# Patient Record
Sex: FEMALE | Race: OTHER | URBAN - METROPOLITAN AREA
[De-identification: names, ages, dates, MRNs, and addresses within clinical notes are randomized per-mention and may not be internally consistent; named-entity substitution may affect disease eponyms.]

---

## 2019-04-22 ENCOUNTER — EMERGENCY (EMERGENCY)
Facility: HOSPITAL | Age: 24
LOS: 1 days | Discharge: HOME | End: 2019-04-22
Attending: EMERGENCY MEDICINE
Payer: SELF-PAY

## 2019-04-22 VITALS
SYSTOLIC BLOOD PRESSURE: 108 MMHG | HEIGHT: 62 IN | RESPIRATION RATE: 16 BRPM | DIASTOLIC BLOOD PRESSURE: 78 MMHG | WEIGHT: 199.96 LBS | TEMPERATURE: 98 F | HEART RATE: 96 BPM | OXYGEN SATURATION: 96 %

## 2019-04-22 VITALS
DIASTOLIC BLOOD PRESSURE: 58 MMHG | OXYGEN SATURATION: 100 % | RESPIRATION RATE: 20 BRPM | SYSTOLIC BLOOD PRESSURE: 108 MMHG | HEART RATE: 75 BPM | TEMPERATURE: 97 F

## 2019-04-22 DIAGNOSIS — Z98.84 BARIATRIC SURGERY STATUS: Chronic | ICD-10-CM

## 2019-04-22 LAB
ALBUMIN SERPL ELPH-MCNC: 4.2 G/DL — SIGNIFICANT CHANGE UP (ref 3.5–5)
ALP SERPL-CCNC: 64 U/L — SIGNIFICANT CHANGE UP (ref 40–120)
ALT FLD-CCNC: 90 U/L DA — HIGH (ref 10–60)
ANION GAP SERPL CALC-SCNC: 6 MMOL/L — SIGNIFICANT CHANGE UP (ref 5–17)
ANION GAP SERPL CALC-SCNC: 7 MMOL/L — SIGNIFICANT CHANGE UP (ref 5–17)
APPEARANCE UR: CLEAR — SIGNIFICANT CHANGE UP
AST SERPL-CCNC: 60 U/L — HIGH (ref 10–40)
BASOPHILS # BLD AUTO: 0.04 K/UL — SIGNIFICANT CHANGE UP (ref 0–0.2)
BASOPHILS NFR BLD AUTO: 0.6 % — SIGNIFICANT CHANGE UP (ref 0–2)
BILIRUB SERPL-MCNC: 1 MG/DL — SIGNIFICANT CHANGE UP (ref 0.2–1.2)
BILIRUB UR-MCNC: NEGATIVE — SIGNIFICANT CHANGE UP
BUN SERPL-MCNC: 12 MG/DL — SIGNIFICANT CHANGE UP (ref 7–18)
BUN SERPL-MCNC: 14 MG/DL — SIGNIFICANT CHANGE UP (ref 7–18)
CALCIUM SERPL-MCNC: 6.5 MG/DL — CRITICAL LOW (ref 8.4–10.5)
CALCIUM SERPL-MCNC: 9.1 MG/DL — SIGNIFICANT CHANGE UP (ref 8.4–10.5)
CHLORIDE SERPL-SCNC: 110 MMOL/L — HIGH (ref 96–108)
CHLORIDE SERPL-SCNC: 122 MMOL/L — HIGH (ref 96–108)
CO2 SERPL-SCNC: 17 MMOL/L — LOW (ref 22–31)
CO2 SERPL-SCNC: 21 MMOL/L — LOW (ref 22–31)
COLOR SPEC: YELLOW — SIGNIFICANT CHANGE UP
CREAT SERPL-MCNC: 0.61 MG/DL — SIGNIFICANT CHANGE UP (ref 0.5–1.3)
CREAT SERPL-MCNC: 1.03 MG/DL — SIGNIFICANT CHANGE UP (ref 0.5–1.3)
DIFF PNL FLD: ABNORMAL
EOSINOPHIL # BLD AUTO: 0.11 K/UL — SIGNIFICANT CHANGE UP (ref 0–0.5)
EOSINOPHIL NFR BLD AUTO: 1.6 % — SIGNIFICANT CHANGE UP (ref 0–6)
GLUCOSE SERPL-MCNC: 101 MG/DL — HIGH (ref 70–99)
GLUCOSE SERPL-MCNC: 62 MG/DL — LOW (ref 70–99)
GLUCOSE UR QL: NEGATIVE — SIGNIFICANT CHANGE UP
HCG UR QL: NEGATIVE — SIGNIFICANT CHANGE UP
HCT VFR BLD CALC: 42.4 % — SIGNIFICANT CHANGE UP (ref 34.5–45)
HGB BLD-MCNC: 14.3 G/DL — SIGNIFICANT CHANGE UP (ref 11.5–15.5)
IMM GRANULOCYTES NFR BLD AUTO: 0.3 % — SIGNIFICANT CHANGE UP (ref 0–1.5)
KETONES UR-MCNC: ABNORMAL
LEUKOCYTE ESTERASE UR-ACNC: ABNORMAL
LIDOCAIN IGE QN: 917 U/L — HIGH (ref 73–393)
LYMPHOCYTES # BLD AUTO: 1.63 K/UL — SIGNIFICANT CHANGE UP (ref 1–3.3)
LYMPHOCYTES # BLD AUTO: 23.1 % — SIGNIFICANT CHANGE UP (ref 13–44)
MCHC RBC-ENTMCNC: 29.2 PG — SIGNIFICANT CHANGE UP (ref 27–34)
MCHC RBC-ENTMCNC: 33.7 GM/DL — SIGNIFICANT CHANGE UP (ref 32–36)
MCV RBC AUTO: 86.7 FL — SIGNIFICANT CHANGE UP (ref 80–100)
MONOCYTES # BLD AUTO: 0.97 K/UL — HIGH (ref 0–0.9)
MONOCYTES NFR BLD AUTO: 13.8 % — SIGNIFICANT CHANGE UP (ref 2–14)
NEUTROPHILS # BLD AUTO: 4.28 K/UL — SIGNIFICANT CHANGE UP (ref 1.8–7.4)
NEUTROPHILS NFR BLD AUTO: 60.6 % — SIGNIFICANT CHANGE UP (ref 43–77)
NITRITE UR-MCNC: NEGATIVE — SIGNIFICANT CHANGE UP
NRBC # BLD: 0 /100 WBCS — SIGNIFICANT CHANGE UP (ref 0–0)
PH UR: 7 — SIGNIFICANT CHANGE UP (ref 5–8)
PLATELET # BLD AUTO: 309 K/UL — SIGNIFICANT CHANGE UP (ref 150–400)
POTASSIUM SERPL-MCNC: 2.3 MMOL/L — CRITICAL LOW (ref 3.5–5.3)
POTASSIUM SERPL-MCNC: 3.7 MMOL/L — SIGNIFICANT CHANGE UP (ref 3.5–5.3)
POTASSIUM SERPL-SCNC: 2.3 MMOL/L — CRITICAL LOW (ref 3.5–5.3)
POTASSIUM SERPL-SCNC: 3.7 MMOL/L — SIGNIFICANT CHANGE UP (ref 3.5–5.3)
PROT SERPL-MCNC: 9 G/DL — HIGH (ref 6–8.3)
PROT UR-MCNC: 100
RBC # BLD: 4.89 M/UL — SIGNIFICANT CHANGE UP (ref 3.8–5.2)
RBC # FLD: 15.7 % — HIGH (ref 10.3–14.5)
SODIUM SERPL-SCNC: 138 MMOL/L — SIGNIFICANT CHANGE UP (ref 135–145)
SODIUM SERPL-SCNC: 145 MMOL/L — SIGNIFICANT CHANGE UP (ref 135–145)
SP GR SPEC: 1 — LOW (ref 1.01–1.02)
UROBILINOGEN FLD QL: 4
WBC # BLD: 7.05 K/UL — SIGNIFICANT CHANGE UP (ref 3.8–10.5)
WBC # FLD AUTO: 7.05 K/UL — SIGNIFICANT CHANGE UP (ref 3.8–10.5)

## 2019-04-22 PROCEDURE — 99285 EMERGENCY DEPT VISIT HI MDM: CPT

## 2019-04-22 PROCEDURE — 99284 EMERGENCY DEPT VISIT MOD MDM: CPT | Mod: 25

## 2019-04-22 PROCEDURE — 80053 COMPREHEN METABOLIC PANEL: CPT

## 2019-04-22 PROCEDURE — 85027 COMPLETE CBC AUTOMATED: CPT

## 2019-04-22 PROCEDURE — 96374 THER/PROPH/DIAG INJ IV PUSH: CPT

## 2019-04-22 PROCEDURE — 80048 BASIC METABOLIC PNL TOTAL CA: CPT

## 2019-04-22 PROCEDURE — 93005 ELECTROCARDIOGRAM TRACING: CPT

## 2019-04-22 PROCEDURE — 83690 ASSAY OF LIPASE: CPT

## 2019-04-22 PROCEDURE — 36415 COLL VENOUS BLD VENIPUNCTURE: CPT

## 2019-04-22 PROCEDURE — 96376 TX/PRO/DX INJ SAME DRUG ADON: CPT

## 2019-04-22 PROCEDURE — 81001 URINALYSIS AUTO W/SCOPE: CPT

## 2019-04-22 PROCEDURE — 81025 URINE PREGNANCY TEST: CPT

## 2019-04-22 RX ORDER — POTASSIUM CHLORIDE 20 MEQ
40 PACKET (EA) ORAL ONCE
Qty: 0 | Refills: 0 | Status: COMPLETED | OUTPATIENT
Start: 2019-04-22 | End: 2019-04-22

## 2019-04-22 RX ORDER — POTASSIUM CHLORIDE 20 MEQ
10 PACKET (EA) ORAL
Qty: 0 | Refills: 0 | Status: COMPLETED | OUTPATIENT
Start: 2019-04-22 | End: 2019-04-22

## 2019-04-22 RX ORDER — LIDOCAINE 4 G/100G
5 CREAM TOPICAL ONCE
Qty: 0 | Refills: 0 | Status: COMPLETED | OUTPATIENT
Start: 2019-04-22 | End: 2019-04-22

## 2019-04-22 RX ORDER — ONDANSETRON 8 MG/1
1 TABLET, FILM COATED ORAL
Qty: 10 | Refills: 0 | OUTPATIENT
Start: 2019-04-22

## 2019-04-22 RX ORDER — FAMOTIDINE 10 MG/ML
20 INJECTION INTRAVENOUS ONCE
Qty: 0 | Refills: 0 | Status: COMPLETED | OUTPATIENT
Start: 2019-04-22 | End: 2019-04-22

## 2019-04-22 RX ADMIN — Medication 30 MILLILITER(S): at 13:02

## 2019-04-22 RX ADMIN — Medication 100 MILLIEQUIVALENT(S): at 12:30

## 2019-04-22 RX ADMIN — Medication 100 MILLIEQUIVALENT(S): at 11:31

## 2019-04-22 RX ADMIN — Medication 40 MILLIEQUIVALENT(S): at 11:31

## 2019-04-22 RX ADMIN — Medication 40 MILLIEQUIVALENT(S): at 12:48

## 2019-04-22 RX ADMIN — LIDOCAINE 5 MILLILITER(S): 4 CREAM TOPICAL at 13:02

## 2019-04-22 RX ADMIN — Medication 100 MILLIEQUIVALENT(S): at 13:03

## 2019-04-22 RX ADMIN — FAMOTIDINE 20 MILLIGRAM(S): 10 INJECTION INTRAVENOUS at 13:01

## 2019-04-22 NOTE — ED PROVIDER NOTE - CLINICAL SUMMARY MEDICAL DECISION MAKING FREE TEXT BOX
22 y/o F pt presenting with vomiting x1 month s/p gastric sleeve. Currently pt's abd is nontender, therefore, will defer imaging studies. Will obtain labs and reassess. 22 y/o F pt presenting with vomiting x1 month s/p gastric sleeve. no change in nature of emesis, and mostly occurs after dinner in evening. Currently pt's abd is nontender, therefore, will defer imaging studies. Will obtain labs and reassess.

## 2019-04-22 NOTE — ED PROVIDER NOTE - OBJECTIVE STATEMENT
22 y/o F pt with PSHx gastric sleeve (on March 4th at [place]) presents to ED c/o frequent vomiting x ~1 month. Pt also reports associated bloating, constipation, not passing gas, and abd discomfort described as burning sensation. Pt denies fever, chills, diarrhea, dysuria, or any other complaints. 24 y/o F pt with PSHx gastric sleeve (on March 4th at [place]) presents to ED c/o frequent vomiting x ~1 month. Pt also reports associated bloating and abd discomfort described as burning sensation. Pt reports that she is having nl BMs and is passing gas. Pt denies fever, chills, diarrhea, dysuria, or any other complaints.

## 2019-04-22 NOTE — ED PROVIDER NOTE - NSFOLLOWUPINSTRUCTIONS_ED_ALL_ED_FT
Sleeve Gastrectomy, Care After  Refer to this sheet in the next few weeks. These instructions provide you with information about caring for yourself after your procedure. Your health care provider may also give you more specific instructions. Your treatment has been planned according to current medical practices, but problems sometimes occur. Call your health care provider if you have any problems or questions after your procedure.    What can I expect after the procedure?  After the procedure, it is common to have:    Pain in your abdomen.  Decreased appetite.  Clear fluid leaking through the small tube (drain) that comes from your incision site.    Follow these instructions at home:  Medicines     Take over-the-counter and prescription medicines only as told by your health care provider.  Do not drive for 24 hours if you received a sedative.  Do not drive or operate heavy machinery while taking prescription pain medicine.  Incision and drain care     Image   Follow instructions from your health care provider about how to take care of your incisions. Make sure you:    Wash your hands with soap and water before you change your bandage (dressing). If soap and water are not available, use hand .  Change your dressing as told by your health care provider.  Leave stitches (sutures), skin glue, or adhesive strips in place. These skin closures may need to be in place for 2 weeks or longer. If adhesive strip edges start to loosen and curl up, you may trim the loose edges. Do not remove adhesive strips completely unless your health care provider tells you to do that.    Keep the area around your incisions and your drain clean and dry.  Check your incision areas every day for signs of infection. Check for:    More redness, swelling, or pain.  More fluid or blood.  Warmth.  Pus or a bad smell.    Empty your drain every day. Follow instructions from your health care provider about recording the amount of fluid that comes from your drain. Make note of any changes in the amount or appearance of the fluid.  Activity     Return to your normal activities as told by your health care provider. Ask your health care provider what activities are safe for you.  Do not lift anything that is heavier than 10 lb (4.5 kg).  Avoid intense physical activity for as long as told by your health care provider.  Move around at least once per day, every day. As you start to feel better, you may start to exercise more.  Eating and drinking     Follow instructions from your health care provider about eating or drinking restrictions. You will be given instructions about the type, the size, and the timing of your meals.    Keep track of any foods that cause discomfort, such as bloating or cramping.  Eat healthy foods. Avoid foods that are high in fat or sugar.    Stop eating when you feel full.  Take supplements only as told by your health care provider.  Drink enough fluid to keep your urine clear or pale yellow.  General instructions     Do not take baths, swim, or use a hot tub until your health care provider approves. Ask your health care provider if you can take showers. You may only be allowed to take sponge baths for bathing.  Do not use tobacco products, including cigarettes, chewing tobacco, or e-cigarettes. If you need help quitting, ask your health care provider.  Wear compression stockings as told by your health care provider. These stockings help to prevent blood clots and reduce swelling in your legs.  Do breathing exercises as told by your health care provider.  Keep all follow-up visits as told by your health care provider. This is important.  Contact a health care provider if:  You have pain that gets worse or does not get better with medicine.  You have more redness, swelling, or pain around your incisions.  You have more fluid or blood coming from your incisions.  Your incisions feel warm to the touch.  You have pus or a bad smell coming from your incisions.  You have a fever or chills.  You have problems with your drain.  You have green or bad-smelling fluid leaking from your drain.  Get help right away if:  You have difficulty breathing.  You have severe pain, especially in your legs.  This information is not intended to replace advice given to you by your health care provider. Make sure you discuss any questions you have with your health care provider. Sleeve Gastrectomy, Care After  Refer to this sheet in the next few weeks. These instructions provide you with information about caring for yourself after your procedure. Your health care provider may also give you more specific instructions. Your treatment has been planned according to current medical practices, but problems sometimes occur. Call your health care provider if you have any problems or questions after your procedure.    What can I expect after the procedure?  After the procedure, it is common to have:    Pain in your abdomen.  Decreased appetite.  Clear fluid leaking through the small tube (drain) that comes from your incision site.    Follow these instructions at home:  Medicines     Take over-the-counter and prescription medicines only as told by your health care provider.  Do not drive for 24 hours if you received a sedative.  Do not drive or operate heavy machinery while taking prescription pain medicine.  Incision and drain care     Image   Follow instructions from your health care provider about how to take care of your incisions. Make sure you:    Wash your hands with soap and water before you change your bandage (dressing). If soap and water are not available, use hand .  Change your dressing as told by your health care provider.  Leave stitches (sutures), skin glue, or adhesive strips in place. These skin closures may need to be in place for 2 weeks or longer. If adhesive strip edges start to loosen and curl up, you may trim the loose edges. Do not remove adhesive strips completely unless your health care provider tells you to do that.    Keep the area around your incisions and your drain clean and dry.  Check your incision areas every day for signs of infection. Check for:    More redness, swelling, or pain.  More fluid or blood.  Warmth.  Pus or a bad smell.    Empty your drain every day. Follow instructions from your health care provider about recording the amount of fluid that comes from your drain. Make note of any changes in the amount or appearance of the fluid.  Activity     Return to your normal activities as told by your health care provider. Ask your health care provider what activities are safe for you.  Do not lift anything that is heavier than 10 lb (4.5 kg).  Avoid intense physical activity for as long as told by your health care provider.  Move around at least once per day, every day. As you start to feel better, you may start to exercise more.  Eating and drinking     Follow instructions from your health care provider about eating or drinking restrictions. You will be given instructions about the type, the size, and the timing of your meals.    Keep track of any foods that cause discomfort, such as bloating or cramping.  Eat healthy foods. Avoid foods that are high in fat or sugar.    Stop eating when you feel full.  Take supplements only as told by your health care provider.  Drink enough fluid to keep your urine clear or pale yellow.  General instructions     Do not take baths, swim, or use a hot tub until your health care provider approves. Ask your health care provider if you can take showers. You may only be allowed to take sponge baths for bathing.  Do not use tobacco products, including cigarettes, chewing tobacco, or e-cigarettes. If you need help quitting, ask your health care provider.  Wear compression stockings as told by your health care provider. These stockings help to prevent blood clots and reduce swelling in your legs.  Do breathing exercises as told by your health care provider.  Keep all follow-up visits as told by your health care provider. This is important.  Contact a health care provider if:  You have pain that gets worse or does not get better with medicine.  You have more redness, swelling, or pain around your incisions.  You have more fluid or blood coming from your incisions.  Your incisions feel warm to the touch.  You have pus or a bad smell coming from your incisions.  You have a fever or chills.  You have problems with your drain.  You have green or bad-smelling fluid leaking from your drain.  Get help right away if:  You have difficulty breathing.  You have severe pain, especially in your legs.  This information is not intended to replace advice given to you by your health care provider. Make sure you discuss any questions you have with your health care provider.    Hypokalemia    WHAT YOU NEED TO KNOW:    Hypokalemia is a low level of potassium in your blood. Potassium helps control how your muscles, heart, and digestive system work. Hypokalemia occurs when your body loses too much potassium or does not absorb enough from food.     DISCHARGE INSTRUCTIONS:    Seek care immediately if:     You cannot move your arm or leg.      You have a fast or irregular heartbeat.      You are too tired or weak to stand up.    Contact your healthcare provider if:     You are vomiting, or you have diarrhea.      You have numbness or tingling in your arms or legs.      Your symptoms do not go away or they get worse.      You have questions or concerns about your condition or care.    Medicines:     Potassium will be given to bring your potassium levels back to normal.      Take your medicine as directed. Contact your healthcare provider if you think your medicine is not helping or if you have side effects. Tell him of her if you are allergic to any medicine. Keep a list of the medicines, vitamins, and herbs you take. Include the amounts, and when and why you take them. Bring the list or the pill bottles to follow-up visits. Carry your medicine list with you in case of an emergency.    Eat foods that are high in potassium: Foods that are high in potassium include bananas, oranges, tomatoes, potatoes, and avocado. Olguin beans, turkey, salmon, lean beef, yogurt, and milk are also high in potassium. Ask your healthcare provider or dietitian for more information about foods that are high in potassium.     Follow up with your healthcare provider as directed: Write down your questions so you remember to ask them during your visits.     Calcium Intake Recommendations  Calcium is a mineral that affects many functions in the body, including:    Blood clotting.  Blood vessel function.  Nerve impulse conduction.  Hormone secretion.  Muscle contraction.  Bone and teeth functions.    Most of your body's calcium supply is stored in your bones and teeth. When your calcium stores are low, you may be at risk for low bone mass, bone loss, and bone fractures. Consuming enough calcium helps to grow healthy bones and teeth and to prevent breakdown over time.    It is very important that you get enough calcium if you are:    A child undergoing rapid growth.  An adolescent girl.  A pre- or post-menopausal woman.  A woman whose menstrual cycle has stopped due to anorexia nervosa or regular intense exercise.  An individual with lactose intolerance or a milk allergy.  A vegetarian.    What is my plan?  Try to consume the recommended amount of calcium daily based on your age. Depending on your overall health, your health care provider may recommend increased calcium intake. General daily calcium intake recommendations by age are:    Birth to 6 months: 200 mg.  Infants 7 to 12 months: 260 mg.  Children 1 to 3 years: 700 mg.  Children 4 to 8 years: 1,000 mg.  Children 9 to 13 years: 1,300 mg.  Teens 14 to 18 years: 1,300 mg.  Adults 19 to 50 years: 1,000 mg.  Adult women 51 to 70 years: 1,200 mg.  Adult men 51 to 70 years: 1,000 mg.  Adults 71 years and older: 1,200 mg.  Pregnant and breastfeeding teens: 1,300 mg.  Pregnant and breastfeeding adults: 1,000 mg.    What do I need to know about calcium intake?  In order for the body to absorb calcium, it needs vitamin D. You can get vitamin D through:    Direct exposure of the skin to sunlight.  Foods, such as egg yolks, liver, saltwater fish, and fortified milk.  Supplements.    Consuming too much calcium may cause:    Constipation.  Decreased absorption of iron and zinc.  Kidney stones.    Calcium supplements may interact with certain medicines. Check with your health care provider before starting any calcium supplements.  Try to get most of your calcium from food.  What foods can I eat?  Grains     Image   Fortified oatmeal. Fortified ready-to-eat cereals. Fortified frozen waffles.    Vegetables     Turnip greens. Broccoli.    Fruits     Fortified orange juice.    Meats and Other Protein Sources     Canned sardines with bones. Canned salmon with bones. Soy beans. Tofu. Baked beans. Almonds. Brazil nuts. Sunflower seeds.    Dairy     Milk. Yogurt. Cheese. Cottage cheese.    Beverages     Fortified soy milk. Fortified rice milk.    Sweets/Desserts     Pudding. Ice Cream. Milkshakes. Blackstrap molasses.    The items listed above may not be a complete list of recommended foods or beverages. Contact your dietitian for more options.     What foods can affect my calcium intake?  It may be more difficult for your body to use calcium or calcium may leave your body more quickly if you consume large amounts of:    Sodium.  Protein.  Caffeine.  Alcohol.

## 2019-04-22 NOTE — ED PROVIDER NOTE - PROGRESS NOTE DETAILS
Pt w hypoK on labs, no assoc EKG changes, repleted, advised to continue small portion control, intake of MVs and fu w [facility] where pt had gastric sleeve procedure. Evan PO prior to dc.

## 2019-04-22 NOTE — ED PROVIDER NOTE - CARE PROVIDER_API CALL
Joe Blackwood (MD)  Surgery  [address] [place] [address]  Phone: [phone]  Fax: [phone]  Follow Up Time:

## 2019-04-29 ENCOUNTER — INBOUND DOCUMENT (OUTPATIENT)
Age: 24
End: 2019-04-29

## 2019-06-24 PROBLEM — Z00.00 ENCOUNTER FOR PREVENTIVE HEALTH EXAMINATION: Status: ACTIVE | Noted: 2019-06-24

## 2022-03-31 NOTE — PROVIDER CONTACT NOTE (CRITICAL VALUE NOTIFICATION) - NAME OF MD/NP/PA/DO NOTIFIED:
How Severe Is Your Skin Lesion?: moderate Have Your Skin Lesions Been Treated?: not been treated Is This A New Presentation, Or A Follow-Up?: Skin Lesion MOR

## 2024-08-22 ENCOUNTER — APPOINTMENT (OUTPATIENT)
Dept: ANTEPARTUM | Facility: CLINIC | Age: 29
End: 2024-08-22

## 2024-08-22 ENCOUNTER — ASOB RESULT (OUTPATIENT)
Age: 29
End: 2024-08-22

## 2024-08-22 PROCEDURE — 76801 OB US < 14 WKS SINGLE FETUS: CPT | Mod: 59

## 2024-08-22 PROCEDURE — 76813 OB US NUCHAL MEAS 1 GEST: CPT

## 2024-08-22 PROCEDURE — 76817 TRANSVAGINAL US OBSTETRIC: CPT | Mod: 59

## 2024-10-11 ENCOUNTER — ASOB RESULT (OUTPATIENT)
Age: 29
End: 2024-10-11

## 2024-10-11 ENCOUNTER — APPOINTMENT (OUTPATIENT)
Dept: ANTEPARTUM | Facility: CLINIC | Age: 29
End: 2024-10-11
Payer: COMMERCIAL

## 2024-10-11 PROCEDURE — 76811 OB US DETAILED SNGL FETUS: CPT

## 2024-10-17 ENCOUNTER — APPOINTMENT (OUTPATIENT)
Dept: ANTEPARTUM | Facility: CLINIC | Age: 29
End: 2024-10-17
Payer: COMMERCIAL

## 2024-10-17 ENCOUNTER — ASOB RESULT (OUTPATIENT)
Age: 29
End: 2024-10-17

## 2024-10-17 ENCOUNTER — APPOINTMENT (OUTPATIENT)
Dept: ANTEPARTUM | Facility: CLINIC | Age: 29
End: 2024-10-17

## 2024-10-17 PROCEDURE — 99202 OFFICE O/P NEW SF 15 MIN: CPT | Mod: 25

## 2024-10-17 PROCEDURE — 76816 OB US FOLLOW-UP PER FETUS: CPT

## 2025-02-28 ENCOUNTER — ASOB RESULT (OUTPATIENT)
Age: 30
End: 2025-02-28

## 2025-02-28 ENCOUNTER — APPOINTMENT (OUTPATIENT)
Dept: ANTEPARTUM | Facility: CLINIC | Age: 30
End: 2025-02-28
Payer: MEDICAID

## 2025-02-28 ENCOUNTER — OUTPATIENT (OUTPATIENT)
Dept: INPATIENT UNIT | Facility: HOSPITAL | Age: 30
LOS: 1 days | Discharge: HOME | End: 2025-02-28

## 2025-02-28 VITALS
DIASTOLIC BLOOD PRESSURE: 67 MMHG | HEART RATE: 78 BPM | RESPIRATION RATE: 16 BRPM | TEMPERATURE: 98 F | SYSTOLIC BLOOD PRESSURE: 106 MMHG

## 2025-02-28 VITALS — HEART RATE: 101 BPM | DIASTOLIC BLOOD PRESSURE: 67 MMHG | SYSTOLIC BLOOD PRESSURE: 98 MMHG

## 2025-02-28 DIAGNOSIS — O26.899 OTHER SPECIFIED PREGNANCY RELATED CONDITIONS, UNSPECIFIED TRIMESTER: ICD-10-CM

## 2025-02-28 DIAGNOSIS — Z98.84 BARIATRIC SURGERY STATUS: Chronic | ICD-10-CM

## 2025-02-28 DIAGNOSIS — Z98.890 OTHER SPECIFIED POSTPROCEDURAL STATES: Chronic | ICD-10-CM

## 2025-02-28 DIAGNOSIS — Z3A.40 40 WEEKS GESTATION OF PREGNANCY: ICD-10-CM

## 2025-02-28 DIAGNOSIS — O36.8330 MATERNAL CARE FOR ABNORMALITIES OF THE FETAL HEART RATE OR RHYTHM, THIRD TRIMESTER, NOT APPLICABLE OR UNSPECIFIED: ICD-10-CM

## 2025-02-28 PROCEDURE — 76815 OB US LIMITED FETUS(S): CPT | Mod: 26

## 2025-02-28 PROCEDURE — 76819 FETAL BIOPHYS PROFIL W/O NST: CPT | Mod: 26

## 2025-02-28 NOTE — OB PROVIDER TRIAGE NOTE - HISTORY OF PRESENT ILLNESS
29 year old  at 40 weeks was sent to triage for monitoring for NRNST in office, as per Dr. Luis, patient had moderate variability with one acceleration and no decelerations with a bpp of 8/8. Patient denies contractions, leaking of fluid, vaginal bleeding, reports good fetal movement. Denies ob complications.   Meds: Valtrex 500mg BID, baby asa BID     PNC: [facility]

## 2025-02-28 NOTE — OB PROVIDER TRIAGE NOTE - NS_OBGYNHISTORY_OBGYN_ALL_OB_FT
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

## 2025-02-28 NOTE — OB PROVIDER TRIAGE NOTE - PLAN OF CARE
Fetal and Maternal status reassuring   NST reactive, bpp 8/8   Tracing reviewed by Dr. Luis   patient to be discharged home at this time     labor precautions reviewed  fetal kick counts reviewed  oral hydration encouraged   follow up with OB by Tuesday 3/4     discharged at: Fetal and Maternal status reassuring   NST reactive, bpp 8/8   Tracing reviewed by Dr. Luis   patient to be discharged home at this time     labor precautions reviewed  fetal kick counts reviewed  oral hydration encouraged   follow up with OB by Tuesday 3/4     discharged at: 1520

## 2025-02-28 NOTE — OB PROVIDER TRIAGE NOTE - NSHPPHYSICALEXAM_GEN_ALL_CORE
Vital Signs Last 24 Hrs  T(C): 36.4 (28 Feb 2025 13:56), Max: 36.4 (28 Feb 2025 13:56)  T(F): 97.5 (28 Feb 2025 13:56), Max: 97.5 (28 Feb 2025 13:56)  HR: 101 (28 Feb 2025 14:10) (78 - 101)  BP: 98/67 (28 Feb 2025 14:10) (98/67 - 106/67)  BP(mean): --  RR: 16 (28 Feb 2025 13:56) (16 - 16)  SpO2: --    Assessment reveals VSS  General: Female sitting comfortably in no apparent distress  Neuro: No facial asymmetry, no slurred speech, moves all 4 extremities  Mood: Alert and lucid, appropriate mood and affect  A&Ox3  Lungs- clear bilateral  Heart- normal rate and regular rhythm  Extremities- Warm, Dry, no edema present, good pulses   Abdomen soft, NT, gravid  Cat 1 tracing reactive  moderate variability with accels and no decels, irregular contractions on toco   Transabdominal Ultrasound- cephalic, anterior placenta, m mode 124, URBAN 14.35, bpp 8/8- images saved ASOB

## 2025-02-28 NOTE — OB PROVIDER TRIAGE NOTE - NSOBPROVIDERNOTE_OBGYN_ALL_OB_FT
1510: Fetal and maternal status reassuring   -Tracing reviewed by Dr. Kang  -patient approved to be discharged home at this time

## 2025-02-28 NOTE — OB PROVIDER TRIAGE NOTE - NS_FHRACCEL_OBGYN_ALL_OB
Has she ever seen a colorectal surgeon for this issue?  I would recommend she see one if not. Laura Fernández is one she could see if she is on her insurance.   Present (15 x15 bpm)

## 2025-02-28 NOTE — OB PROVIDER TRIAGE NOTE - NSICDXPASTSURGICALHX_GEN_ALL_CORE_FT
Detail Level: Zone PAST SURGICAL HISTORY:  H/O cosmetic surgery     H/O gastric bypass     H/O hernia repair

## 2025-02-28 NOTE — OB PROVIDER TRIAGE NOTE - NS_TRIAGEEVALUATION_OBGYN_ALL_OB_DT
Received call from Angely Thomas, work comp calling to see if patient was seen 4/28/17. Informed message was given to Jacqueline Levin on Friday that patient was seen but note not complete. Will forward within the next 5 business days. No new appointment scheduled.   She verbalized understanding.     MARILOU Bentley RN    
Received voicemail from Angely Thomas, work comp. They want to clarify patient was seen today with Dr. French and would like to know restrictions and work status, diagnosis, medications and treatment, as well as next office visit. They also want office visit note faxed to attn: Jacqueline Levin, nurse : [phone]. If questions, please call: [phone], Ext [phone].     Office visit not  complete from today. Angely sent written request that has already been scanned.   Left message for Jacqueline that we have received written request and will send note when it is completed.     Awaiting note to be completed.     MARILOU Bentley RN    
Visit note and letter from 4/27/2017 office visit faxed as requested.  
28-Feb-2025 14:40

## 2025-02-28 NOTE — OB PROVIDER TRIAGE NOTE - ADDITIONAL INSTRUCTIONS
Return for contractions, leaking of fluid, vaginal bleeding, decreased fetal movement  Fetal kick counts reviewed  oral hydration encouraged  follow up with OB by Tuesday 3/4

## 2025-03-08 ENCOUNTER — INPATIENT (INPATIENT)
Facility: HOSPITAL | Age: 30
LOS: 2 days | Discharge: HOME | End: 2025-03-11
Attending: STUDENT IN AN ORGANIZED HEALTH CARE EDUCATION/TRAINING PROGRAM | Admitting: STUDENT IN AN ORGANIZED HEALTH CARE EDUCATION/TRAINING PROGRAM
Payer: MEDICAID

## 2025-03-08 ENCOUNTER — APPOINTMENT (OUTPATIENT)
Dept: ANTEPARTUM | Facility: CLINIC | Age: 30
End: 2025-03-08

## 2025-03-08 VITALS
RESPIRATION RATE: 16 BRPM | OXYGEN SATURATION: 100 % | TEMPERATURE: 98 F | SYSTOLIC BLOOD PRESSURE: 86 MMHG | HEIGHT: 62 IN | HEART RATE: 66 BPM | DIASTOLIC BLOOD PRESSURE: 55 MMHG | WEIGHT: 194.01 LBS

## 2025-03-08 DIAGNOSIS — O26.899 OTHER SPECIFIED PREGNANCY RELATED CONDITIONS, UNSPECIFIED TRIMESTER: ICD-10-CM

## 2025-03-08 DIAGNOSIS — Z98.890 OTHER SPECIFIED POSTPROCEDURAL STATES: Chronic | ICD-10-CM

## 2025-03-08 DIAGNOSIS — Z98.84 BARIATRIC SURGERY STATUS: Chronic | ICD-10-CM

## 2025-03-08 LAB
BASOPHILS # BLD AUTO: 0.04 K/UL — SIGNIFICANT CHANGE UP (ref 0–0.2)
BASOPHILS NFR BLD AUTO: 0.5 % — SIGNIFICANT CHANGE UP (ref 0–2)
BLD GP AB SCN SERPL QL: NEGATIVE — SIGNIFICANT CHANGE UP
EOSINOPHIL # BLD AUTO: 0.06 K/UL — SIGNIFICANT CHANGE UP (ref 0–0.5)
EOSINOPHIL NFR BLD AUTO: 0.8 % — SIGNIFICANT CHANGE UP (ref 0–6)
HCT VFR BLD CALC: 34.5 % — SIGNIFICANT CHANGE UP (ref 34.5–45)
HGB BLD-MCNC: 12.4 G/DL — SIGNIFICANT CHANGE UP (ref 11.5–15.5)
IANC: 5.58 K/UL — SIGNIFICANT CHANGE UP (ref 1.8–7.4)
IMM GRANULOCYTES NFR BLD AUTO: 0.9 % — SIGNIFICANT CHANGE UP (ref 0–0.9)
LYMPHOCYTES # BLD AUTO: 1.09 K/UL — SIGNIFICANT CHANGE UP (ref 1–3.3)
LYMPHOCYTES # BLD AUTO: 14.4 % — SIGNIFICANT CHANGE UP (ref 13–44)
MCHC RBC-ENTMCNC: 31.1 PG — SIGNIFICANT CHANGE UP (ref 27–34)
MCHC RBC-ENTMCNC: 35.9 G/DL — SIGNIFICANT CHANGE UP (ref 32–36)
MCV RBC AUTO: 86.5 FL — SIGNIFICANT CHANGE UP (ref 80–100)
MONOCYTES # BLD AUTO: 0.72 K/UL — SIGNIFICANT CHANGE UP (ref 0–0.9)
MONOCYTES NFR BLD AUTO: 9.5 % — SIGNIFICANT CHANGE UP (ref 2–14)
NEUTROPHILS # BLD AUTO: 5.58 K/UL — SIGNIFICANT CHANGE UP (ref 1.8–7.4)
NEUTROPHILS NFR BLD AUTO: 73.9 % — SIGNIFICANT CHANGE UP (ref 43–77)
NRBC # BLD AUTO: 0 K/UL — SIGNIFICANT CHANGE UP (ref 0–0)
NRBC # FLD: 0 K/UL — SIGNIFICANT CHANGE UP (ref 0–0)
NRBC BLD AUTO-RTO: 0 /100 WBCS — SIGNIFICANT CHANGE UP (ref 0–0)
PLATELET # BLD AUTO: 345 K/UL — SIGNIFICANT CHANGE UP (ref 150–400)
RBC # BLD: 3.99 M/UL — SIGNIFICANT CHANGE UP (ref 3.8–5.2)
RBC # FLD: 16.7 % — HIGH (ref 10.3–14.5)
RH IG SCN BLD-IMP: POSITIVE — SIGNIFICANT CHANGE UP
RH IG SCN BLD-IMP: POSITIVE — SIGNIFICANT CHANGE UP
WBC # BLD: 7.56 K/UL — SIGNIFICANT CHANGE UP (ref 3.8–10.5)
WBC # FLD AUTO: 7.56 K/UL — SIGNIFICANT CHANGE UP (ref 3.8–10.5)

## 2025-03-08 RX ORDER — SODIUM CHLORIDE 9 G/1000ML
1000 INJECTION, SOLUTION INTRAVENOUS
Refills: 0 | Status: DISCONTINUED | OUTPATIENT
Start: 2025-03-08 | End: 2025-03-08

## 2025-03-08 RX ORDER — SODIUM CHLORIDE 9 G/1000ML
1000 INJECTION, SOLUTION INTRAVENOUS
Refills: 0 | Status: DISCONTINUED | OUTPATIENT
Start: 2025-03-08 | End: 2025-03-09

## 2025-03-08 RX ORDER — OXYTOCIN-SODIUM CHLORIDE 0.9% IV SOLN 30 UNIT/500ML 30-0.9/5 UT/ML-%
167 SOLUTION INTRAVENOUS
Qty: 30 | Refills: 0 | Status: DISCONTINUED | OUTPATIENT
Start: 2025-03-08 | End: 2025-03-09

## 2025-03-08 RX ORDER — OXYTOCIN-SODIUM CHLORIDE 0.9% IV SOLN 30 UNIT/500ML 30-0.9/5 UT/ML-%
2 SOLUTION INTRAVENOUS
Qty: 30 | Refills: 0 | Status: DISCONTINUED | OUTPATIENT
Start: 2025-03-08 | End: 2025-03-09

## 2025-03-08 RX ORDER — ONDANSETRON HCL/PF 4 MG/2 ML
4 VIAL (ML) INJECTION ONCE
Refills: 0 | Status: COMPLETED | OUTPATIENT
Start: 2025-03-08 | End: 2025-03-08

## 2025-03-08 RX ORDER — OXYTOCIN-SODIUM CHLORIDE 0.9% IV SOLN 30 UNIT/500ML 30-0.9/5 UT/ML-%
SOLUTION INTRAVENOUS
Qty: 30 | Refills: 0 | Status: DISCONTINUED | OUTPATIENT
Start: 2025-03-08 | End: 2025-03-09

## 2025-03-08 RX ORDER — SODIUM CHLORIDE 9 G/1000ML
500 INJECTION, SOLUTION INTRAVENOUS ONCE
Refills: 0 | Status: COMPLETED | OUTPATIENT
Start: 2025-03-08 | End: 2025-03-08

## 2025-03-08 RX ORDER — PIPERACILLIN-TAZO-DEXTROSE,ISO 3.375G/5
4.5 IV SOLUTION, PIGGYBACK PREMIX FROZEN(ML) INTRAVENOUS EVERY 8 HOURS
Refills: 0 | Status: DISCONTINUED | OUTPATIENT
Start: 2025-03-08 | End: 2025-03-10

## 2025-03-08 RX ORDER — AMPICILLIN SODIUM 1 G/1
2 INJECTION, POWDER, FOR SOLUTION INTRAMUSCULAR; INTRAVENOUS ONCE
Refills: 0 | Status: COMPLETED | OUTPATIENT
Start: 2025-03-08 | End: 2025-03-08

## 2025-03-08 RX ORDER — AMPICILLIN SODIUM 1 G/1
1 INJECTION, POWDER, FOR SOLUTION INTRAMUSCULAR; INTRAVENOUS EVERY 4 HOURS
Refills: 0 | Status: DISCONTINUED | OUTPATIENT
Start: 2025-03-08 | End: 2025-03-08

## 2025-03-08 RX ORDER — ACETAMINOPHEN 500 MG/5ML
1000 LIQUID (ML) ORAL ONCE
Refills: 0 | Status: COMPLETED | OUTPATIENT
Start: 2025-03-08 | End: 2025-03-08

## 2025-03-08 RX ORDER — INFLUENZA A VIRUS A/IDAHO/07/2018 (H1N1) ANTIGEN (MDCK CELL DERIVED, PROPIOLACTONE INACTIVATED, INFLUENZA A VIRUS A/INDIANA/08/2018 (H3N2) ANTIGEN (MDCK CELL DERIVED, PROPIOLACTONE INACTIVATED), INFLUENZA B VIRUS B/SINGAPORE/INFTT-16-0610/2016 ANTIGEN (MDCK CELL DERIVED, PROPIOLACTONE INACTIVATED), INFLUENZA B VIRUS B/IOWA/06/2017 ANTIGEN (MDCK CELL DERIVED, PROPIOLACTONE INACTIVATED) 15; 15; 15; 15 UG/.5ML; UG/.5ML; UG/.5ML; UG/.5ML
0.5 INJECTION, SUSPENSION INTRAMUSCULAR ONCE
Refills: 0 | Status: DISCONTINUED | OUTPATIENT
Start: 2025-03-08 | End: 2025-03-11

## 2025-03-08 RX ORDER — CITRIC ACID/SODIUM CITRATE 300-500 MG
15 SOLUTION, ORAL ORAL EVERY 6 HOURS
Refills: 0 | Status: DISCONTINUED | OUTPATIENT
Start: 2025-03-08 | End: 2025-03-09

## 2025-03-08 RX ADMIN — OXYTOCIN-SODIUM CHLORIDE 0.9% IV SOLN 30 UNIT/500ML 2 MILLIUNIT(S)/MIN: 30-0.9/5 SOLUTION at 20:06

## 2025-03-08 RX ADMIN — OXYTOCIN-SODIUM CHLORIDE 0.9% IV SOLN 30 UNIT/500ML 2 MILLIUNIT(S)/MIN: 30-0.9/5 SOLUTION at 14:15

## 2025-03-08 RX ADMIN — OXYTOCIN-SODIUM CHLORIDE 0.9% IV SOLN 30 UNIT/500ML 2 MILLIUNIT(S)/MIN: 30-0.9/5 SOLUTION at 17:00

## 2025-03-08 RX ADMIN — AMPICILLIN SODIUM 100 GRAM(S): 1 INJECTION, POWDER, FOR SOLUTION INTRAMUSCULAR; INTRAVENOUS at 16:55

## 2025-03-08 RX ADMIN — SODIUM CHLORIDE 1000 MILLILITER(S): 9 INJECTION, SOLUTION INTRAVENOUS at 16:31

## 2025-03-08 RX ADMIN — SODIUM CHLORIDE 1000 MILLILITER(S): 9 INJECTION, SOLUTION INTRAVENOUS at 23:12

## 2025-03-08 RX ADMIN — SODIUM CHLORIDE 125 MILLILITER(S): 9 INJECTION, SOLUTION INTRAVENOUS at 20:06

## 2025-03-08 RX ADMIN — SODIUM CHLORIDE 125 MILLILITER(S): 9 INJECTION, SOLUTION INTRAVENOUS at 10:10

## 2025-03-08 RX ADMIN — AMPICILLIN SODIUM 200 GRAM(S): 1 INJECTION, POWDER, FOR SOLUTION INTRAMUSCULAR; INTRAVENOUS at 13:01

## 2025-03-08 RX ADMIN — Medication 400 MILLIGRAM(S): at 23:13

## 2025-03-08 RX ADMIN — AMPICILLIN SODIUM 100 GRAM(S): 1 INJECTION, POWDER, FOR SOLUTION INTRAMUSCULAR; INTRAVENOUS at 22:17

## 2025-03-08 RX ADMIN — Medication 4 MILLIGRAM(S): at 20:02

## 2025-03-08 RX ADMIN — Medication 1 APPLICATION(S): at 11:59

## 2025-03-08 RX ADMIN — Medication 200 GRAM(S): at 23:20

## 2025-03-08 NOTE — OB PROVIDER LABOR PROGRESS NOTE - ASSESSMENT
pt tolerated exam well    - pitocin at 8u, paused during a deceleration  - will reassess FHT in 30m    Amyeo Afroz Jereen, PGY-4  d/w Dr Witt

## 2025-03-08 NOTE — OB PROVIDER H&P - HISTORY OF PRESENT ILLNESS
30yo  @41w1d p/f scheduled late term induction. Endorses CTX q5 min, 7-8/10 pain. Endorses vaginal spotting. -LOF, -Ctx, +FM.  PNC: h/o HSV on Valtrex  GBS: positive bacteriuria  EFW: 3917g  ObHx: eTOPx2, D/Cx2  GynHx: +HSV on Valtrex; +HPV w/colpos and repeat biopsies  MedHx: denies  PSHx: s/p gastric sleeve, does not take NSAIDs; s/p abdominoplasty  PsychHx: denies  SocialHx: denies  AllergyHx: denies  RxHx: PNV, ASA, Valtrex

## 2025-03-08 NOTE — OB PROVIDER H&P - NSLOWPPHRISK_OBGYN_A_OB
No previous uterine incision/[place] Pregnancy/Less than or equal to 4 previous vaginal births/No known bleeding disorder/No history of postpartum hemorrhage

## 2025-03-08 NOTE — CHART NOTE - NSCHARTNOTEFT_GEN_A_CORE
NP note      Pt febrile to 38.3C rectal @11p  Fetal tachycardia to 160  Maternal tachycardia to 110-120s      T(C): 38.3 (03-08-25 @ 23:00), Max: 38.3 (03-08-25 @ 23:00)  HR: 103 (03-08-25 @ 23:21) (57 - 129)  BP: 128/59 (03-08-25 @ 22:45) (77/53 - 128/59)  RR: 17 (03-08-25 @ 23:00) (15 - 17)  SpO2: 100% (03-08-25 @ 23:21) (63% - 100%)      Pt meets criteria for suspected intraamniotic infection  Will start Zosyn 4.5g q 8hrs  Tylenol 1000mg  IV fluid bolus    bossman Arnett NP NP note      Pt febrile to 38.3C rectal @11p  Fetal tachycardia to 155-160bpm  Maternal tachycardia to 110-120s      T(C): 38.3 (03-08-25 @ 23:00), Max: 38.3 (03-08-25 @ 23:00)  HR: 103 (03-08-25 @ 23:21) (57 - 129)  BP: 128/59 (03-08-25 @ 22:45) (77/53 - 128/59)  RR: 17 (03-08-25 @ 23:00) (15 - 17)  SpO2: 100% (03-08-25 @ 23:21) (63% - 100%)      Pt meets criteria for suspected intraamniotic infection  Will start Zosyn 4.5g q 8hrs  Tylenol 1000mg  IV fluid bolus    bossman Arnett NP

## 2025-03-08 NOTE — OB PROVIDER H&P - ASSESSMENT
28yo  @41w1d p/f scheduled late-term IOL.  - admit to L&D  - for pitocin  - Urine GBS positive: ampicillin  - EFW 3917g  - HSV: SSE-  - Routine Labs  - FHT/TOCO  - Anesthesia Consult  - Anticipate      Plan pending Dr. Eduar Marvin PGY1

## 2025-03-08 NOTE — CHART NOTE - NSCHARTNOTEFT_GEN_A_CORE
**Late entry secondary to Clinical Duties**  Patient assessed multiple times throughout shift, arom performed at 3:15am. pt doing well, fetal status reassuring. expecting

## 2025-03-08 NOTE — OB RN PATIENT PROFILE - NUTRITION PROFILE
[facility]   Urology Preop H&P Note    Patient Name: Gin Hunter  : 1961  MRN: [id number]  Primary Care Physician:  Jory Berger APRN  Referring Physician: Sirisha Velazquez MD  Date of admission: 3/4/2024    Subjective   Subjective     Reason for Consult/ Chief Complaint: Right ureteral stone [N20.1]    HPI:  Gin Hunter is a 62 y.o. female history ofRight ureteral stone [N20.1] who presents for further management OR.  Presents for planned Procedure(s):  CYSTOSCOPY URETEROSCOPY RETROGRADE PYELOGRAM HOLMIUM LASER STENT exchange, right;  .  Procedure to be preformed on the right.  Risk, benefits, and alternatives discussed with patient prior to today.All questions were addressed after providing time for discussion.  Patient denies significant changes since last visit.  No new complaints today.    Review of Systems   All systems were reviewed and negative except for the above  Personal History     Past Medical History:   Diagnosis Date    GERD (gastroesophageal reflux disease)     Kidney stone     Rash     LOWER LEGS, WORKING ON VASCULAR REFERRAL    Smoker        Past Surgical History:   Procedure Laterality Date     SECTION      CYSTOSCOPY W/ URETERAL STENT PLACEMENT Right 2023    Procedure: CYSTOSCOPY, RIGHT URETERAL STENT INSERTION;  Surgeon: Jeaneth Lemon MD;  Location: [facility] MAIN OR;  Service: Urology;  Laterality: Right;       Family History: family history includes Cancer in her father and mother; Diabetes in her father; Diabetes type I in her father; Emphysema in her brother; Hypertension in her father and mother; Nephrolithiasis in her daughter. Otherwise pertinent FHx was reviewed and not pertinent to current issue.    Social History:  reports that she has been smoking cigarettes. She started smoking about 45 years ago. She has a 11.5 pack-year smoking history. She has never used smokeless tobacco. She reports that she does not currently use alcohol after a past  usage of about 2.0 standard drinks of alcohol per week. She reports that she does not currently use drugs after having used the following drugs: Cocaine(coke).    Home Medications:  lansoprazole, ondansetron ODT, and potassium chloride    Allergies:  Allergies   Allergen Reactions    Penicillin G Anaphylaxis       Objective    Objective     Vitals:   Temp:  [96.9 °F (36.1 °C)] 96.9 °F (36.1 °C)  Heart Rate:  [104] 104  Resp:  [20] 20  BP: (172)/(88) 172/88    Physical Exam:   Constitutional: Awake, alert   Respiratory: Clear, nonlabored respirations    Cardiovascular: Regular rate, no chest retractions   gastrointestinal: Appears soft, nontender     Results:    Assessment & Plan   Assessment / Plan     Brief Patient Summary:  Gin Hunter is a 62 y.o. female who     Active Hospital Problems:  Active Hospital Problems    Diagnosis     **Right ureteral stone        Plan:   Proceed to the OR for planned procedure, Procedure(s):  CYSTOSCOPY URETEROSCOPY RETROGRADE PYELOGRAM HOLMIUM LASER STENT exchange, right,  , right  Risk, benefits, and alternatives discussed with patient at length she is agreeable to proceed  Laterality identified, right  All questions addressed      Electronically signed by Sirisha Velazquez MD, 03/04/24, 10:35 AM EST.      No indicators present

## 2025-03-08 NOTE — OB PROVIDER LABOR PROGRESS NOTE - ASSESSMENT
@41.1wks LT IOL  sp AROM  cervical change noted  cont pitocin, currently @12mu  cont fetal/maternal monitoring  will reassess PRN    bossman Bernal NP

## 2025-03-08 NOTE — OB PROVIDER H&P - NSHPPHYSICALEXAM_GEN_ALL_CORE
Gen: NAD  CV: clinically well perfused  Pulm: unlabored respirations  Abd: soft, NT, ND, gravid, no rebound or guarding  SVE: 3/70/-3  FHT: baseline 115, mod deneen, - accels, - decels  TOCO: q3-4 min  SSE: negative for visible lesions  Sono: cephalic

## 2025-03-08 NOTE — OB PROVIDER LABOR PROGRESS NOTE - ASSESSMENT
@41.1wks LT IOL  Cervical change noted  Cont pitocin   Cont fetal/maternal monitoring   Will reassess in 1-2 hrs or sooner if clinically indicated    bossman Bernal NP

## 2025-03-08 NOTE — OB PROVIDER LABOR PROGRESS NOTE - NS_SUBJECTIVE/OBJECTIVE_OBGYN_ALL_OB_FT
Patient seen and examined secondary to intermittent rectal pressure.  VS  T(C): 36.5 (03-08-25 @ 19:00)  HR: 78 (03-08-25 @ 20:16)  BP: 93/51 (03-08-25 @ 20:05)  RR: 15 (03-08-25 @ 19:00)  SpO2: 100% (03-08-25 @ 20:16)
FHT with deceleration to 60s x2m recovered to 110s x4m
Patient seen and examined secondary to intermittent rectal pressure.  VS  T(C): 38.3 (03-08-25 @ 23:00)  HR: 117 (03-08-25 @ 23:16)  BP: 128/59 (03-08-25 @ 22:45)  RR: 17 (03-08-25 @ 23:00)  SpO2: 100% (03-08-25 @ 23:11)

## 2025-03-08 NOTE — OB PROVIDER LABOR PROGRESS NOTE - NS_OBIHIFHRDETAILS_OBGYN_ALL_OB_FT
cat2   125/mod/no acceleration, recovered late deceleration
130 modvar/+accels/-decels
150 mod deneen/-accels/+early decels

## 2025-03-09 LAB — T PALLIDUM AB TITR SER: NEGATIVE — SIGNIFICANT CHANGE UP

## 2025-03-09 RX ORDER — BENZOCAINE 220 MG/G
1 SPRAY, METERED PERIODONTAL EVERY 6 HOURS
Refills: 0 | Status: DISCONTINUED | OUTPATIENT
Start: 2025-03-09 | End: 2025-03-11

## 2025-03-09 RX ORDER — WITCH HAZEL LEAF
1 FLUID EXTRACT MISCELLANEOUS EVERY 4 HOURS
Refills: 0 | Status: DISCONTINUED | OUTPATIENT
Start: 2025-03-09 | End: 2025-03-11

## 2025-03-09 RX ORDER — WITCH HAZEL LEAF
1 FLUID EXTRACT MISCELLANEOUS
Qty: 0 | Refills: 0 | DISCHARGE
Start: 2025-03-09

## 2025-03-09 RX ORDER — OXYTOCIN-SODIUM CHLORIDE 0.9% IV SOLN 30 UNIT/500ML 30-0.9/5 UT/ML-%
20 SOLUTION INTRAVENOUS ONCE
Refills: 0 | Status: COMPLETED | OUTPATIENT
Start: 2025-03-09 | End: 2025-03-09

## 2025-03-09 RX ORDER — BENZOCAINE 220 MG/G
1 SPRAY, METERED PERIODONTAL
Qty: 0 | Refills: 0 | DISCHARGE
Start: 2025-03-09

## 2025-03-09 RX ORDER — PRAMOXINE HCL 1 %
1 GEL (GRAM) TOPICAL
Qty: 0 | Refills: 0 | DISCHARGE
Start: 2025-03-09

## 2025-03-09 RX ORDER — OXYCODONE HYDROCHLORIDE 30 MG/1
5 TABLET ORAL ONCE
Refills: 0 | Status: DISCONTINUED | OUTPATIENT
Start: 2025-03-09 | End: 2025-03-11

## 2025-03-09 RX ORDER — DIBUCAINE 10 MG/G
1 OINTMENT TOPICAL
Qty: 0 | Refills: 0 | DISCHARGE
Start: 2025-03-09

## 2025-03-09 RX ORDER — MODIFIED LANOLIN 100 %
1 CREAM (GRAM) TOPICAL EVERY 6 HOURS
Refills: 0 | Status: DISCONTINUED | OUTPATIENT
Start: 2025-03-09 | End: 2025-03-11

## 2025-03-09 RX ORDER — PRAMOXINE HCL 1 %
1 GEL (GRAM) TOPICAL EVERY 4 HOURS
Refills: 0 | Status: DISCONTINUED | OUTPATIENT
Start: 2025-03-09 | End: 2025-03-11

## 2025-03-09 RX ORDER — HYDROCORTISONE 10 MG/G
1 CREAM TOPICAL
Qty: 0 | Refills: 0 | DISCHARGE
Start: 2025-03-09

## 2025-03-09 RX ORDER — DIBUCAINE 10 MG/G
1 OINTMENT TOPICAL EVERY 6 HOURS
Refills: 0 | Status: DISCONTINUED | OUTPATIENT
Start: 2025-03-09 | End: 2025-03-11

## 2025-03-09 RX ORDER — IBUPROFEN 200 MG
600 TABLET ORAL EVERY 6 HOURS
Refills: 0 | Status: DISCONTINUED | OUTPATIENT
Start: 2025-03-09 | End: 2025-03-09

## 2025-03-09 RX ORDER — CLOSTRIDIUM TETANI TOXOID ANTIGEN (FORMALDEHYDE INACTIVATED), CORYNEBACTERIUM DIPHTHERIAE TOXOID ANTIGEN (FORMALDEHYDE INACTIVATED), BORDETELLA PERTUSSIS TOXOID ANTIGEN (GLUTARALDEHYDE INACTIVATED), BORDETELLA PERTUSSIS FILAMENTOUS HEMAGGLUTININ ANTIGEN (FORMALDEHYDE INACTIVATED), BORDETELLA PERTUSSIS PERTACTIN ANTIGEN, AND BORDETELLA PERTUSSIS FIMBRIAE 2/3 ANTIGEN 5; 2; 2.5; 5; 3; 5 [LF]/.5ML; [LF]/.5ML; UG/.5ML; UG/.5ML; UG/.5ML; UG/.5ML
0.5 INJECTION, SUSPENSION INTRAMUSCULAR ONCE
Refills: 0 | Status: DISCONTINUED | OUTPATIENT
Start: 2025-03-09 | End: 2025-03-11

## 2025-03-09 RX ORDER — OXYCODONE HYDROCHLORIDE 30 MG/1
5 TABLET ORAL
Refills: 0 | Status: DISCONTINUED | OUTPATIENT
Start: 2025-03-09 | End: 2025-03-11

## 2025-03-09 RX ORDER — KETOROLAC TROMETHAMINE 30 MG/ML
30 INJECTION, SOLUTION INTRAMUSCULAR; INTRAVENOUS ONCE
Refills: 0 | Status: DISCONTINUED | OUTPATIENT
Start: 2025-03-09 | End: 2025-03-09

## 2025-03-09 RX ORDER — ACETAMINOPHEN 500 MG/5ML
975 LIQUID (ML) ORAL EVERY 6 HOURS
Refills: 0 | Status: COMPLETED | OUTPATIENT
Start: 2025-03-09 | End: 2026-02-05

## 2025-03-09 RX ORDER — OXYCODONE HYDROCHLORIDE 30 MG/1
5 TABLET ORAL ONCE
Refills: 0 | Status: DISCONTINUED | OUTPATIENT
Start: 2025-03-09 | End: 2025-03-09

## 2025-03-09 RX ORDER — ACETAMINOPHEN 500 MG/5ML
1000 LIQUID (ML) ORAL ONCE
Refills: 0 | Status: DISCONTINUED | OUTPATIENT
Start: 2025-03-09 | End: 2025-03-11

## 2025-03-09 RX ORDER — ACETAMINOPHEN 500 MG/5ML
975 LIQUID (ML) ORAL
Refills: 0 | Status: DISCONTINUED | OUTPATIENT
Start: 2025-03-09 | End: 2025-03-09

## 2025-03-09 RX ORDER — OXYTOCIN-SODIUM CHLORIDE 0.9% IV SOLN 30 UNIT/500ML 30-0.9/5 UT/ML-%
167 SOLUTION INTRAVENOUS
Qty: 30 | Refills: 0 | Status: DISCONTINUED | OUTPATIENT
Start: 2025-03-09 | End: 2025-03-09

## 2025-03-09 RX ORDER — MODIFIED LANOLIN 100 %
1 CREAM (GRAM) TOPICAL
Qty: 0 | Refills: 0 | DISCHARGE
Start: 2025-03-09

## 2025-03-09 RX ORDER — DIPHENHYDRAMINE HCL 12.5MG/5ML
25 ELIXIR ORAL EVERY 6 HOURS
Refills: 0 | Status: DISCONTINUED | OUTPATIENT
Start: 2025-03-09 | End: 2025-03-11

## 2025-03-09 RX ORDER — SIMETHICONE 80 MG
80 TABLET,CHEWABLE ORAL EVERY 4 HOURS
Refills: 0 | Status: DISCONTINUED | OUTPATIENT
Start: 2025-03-09 | End: 2025-03-11

## 2025-03-09 RX ORDER — HYDROCORTISONE 10 MG/G
1 CREAM TOPICAL EVERY 6 HOURS
Refills: 0 | Status: DISCONTINUED | OUTPATIENT
Start: 2025-03-09 | End: 2025-03-11

## 2025-03-09 RX ORDER — MAGNESIUM HYDROXIDE 400 MG/5ML
30 SUSPENSION ORAL
Refills: 0 | Status: DISCONTINUED | OUTPATIENT
Start: 2025-03-09 | End: 2025-03-11

## 2025-03-09 RX ORDER — ACETAMINOPHEN 500 MG/5ML
975 LIQUID (ML) ORAL EVERY 6 HOURS
Refills: 0 | Status: DISCONTINUED | OUTPATIENT
Start: 2025-03-09 | End: 2025-03-11

## 2025-03-09 RX ORDER — ACETAMINOPHEN 500 MG/5ML
3 LIQUID (ML) ORAL
Qty: 0 | Refills: 0 | DISCHARGE
Start: 2025-03-09

## 2025-03-09 RX ORDER — PRENATAL 136/IRON/FOLIC ACID 27 MG-1 MG
1 TABLET ORAL DAILY
Refills: 0 | Status: DISCONTINUED | OUTPATIENT
Start: 2025-03-09 | End: 2025-03-11

## 2025-03-09 RX ORDER — OXYCODONE HYDROCHLORIDE 30 MG/1
5 TABLET ORAL
Refills: 0 | Status: DISCONTINUED | OUTPATIENT
Start: 2025-03-09 | End: 2025-03-09

## 2025-03-09 RX ADMIN — Medication 975 MILLIGRAM(S): at 20:20

## 2025-03-09 RX ADMIN — Medication 975 MILLIGRAM(S): at 06:09

## 2025-03-09 RX ADMIN — Medication 0.2 MILLIGRAM(S): at 00:59

## 2025-03-09 RX ADMIN — OXYTOCIN-SODIUM CHLORIDE 0.9% IV SOLN 30 UNIT/500ML 20 UNIT(S): 30-0.9/5 SOLUTION at 00:47

## 2025-03-09 RX ADMIN — Medication 975 MILLIGRAM(S): at 14:49

## 2025-03-09 RX ADMIN — Medication 1000 MILLIGRAM(S): at 00:04

## 2025-03-09 RX ADMIN — Medication 200 GRAM(S): at 17:49

## 2025-03-09 RX ADMIN — Medication 200 GRAM(S): at 09:20

## 2025-03-09 RX ADMIN — Medication 3 MILLILITER(S): at 07:04

## 2025-03-09 RX ADMIN — Medication 975 MILLIGRAM(S): at 20:50

## 2025-03-09 RX ADMIN — Medication 1 TABLET(S): at 14:49

## 2025-03-09 RX ADMIN — Medication 3 MILLILITER(S): at 21:15

## 2025-03-09 RX ADMIN — Medication 975 MILLIGRAM(S): at 05:39

## 2025-03-09 NOTE — OB NEONATOLOGY/PEDIATRICIAN DELIVERY SUMMARY - NSPEDSNEONOTESA_OBGYN_ALL_OB_FT
Peds called to LDR for meconium. 41+2 wk AGA / SGA / LGA female born via  to a 29 y/o  mother.  Maternal hx of prolactinoma (meds stopped w/ preg), umbilical hernia, HSV+ (on valtrex, spec neg). Maternal labs include Blood Type O+ , HIV - , RPR NR , Rubella I , Hep B - , GBS + on  (received amp x3) . ROM with meconium fluids (ROM hours: approx 10hrs). Highest maternal temp: 38.3 C. EOS 0.85. Baby emerged vigorous, crying, was warmed, dried, suctioned, and stimulated with APGARS of 9/9. Resuscitation included: bulb suction and stimulation. Mom plans to initiate breastfeeding, consents Hep B vaccine.    : 3/9/25  TOB: 00:41  BW: ___ g    Physical Exam (Post-Delivery)  Gen: NAD; well-appearing  HEENT: NC/AT; anterior fontanelle open and flat; ears and nose clinically patent, normally set; no tags, no cleft palate appreciated  Skin: pink, warm, well-perfused, no rash  Resp: non-labored breathing  Abd: soft, NT/ND; no masses appreciated, umbilical cord with 3 vessels  Extremities: moving all extremities, no crepitus; hips negative O/B  MSK: no clavicular fracture appreciated  : Geraldo I; no abnormalities; anus patent  Back: no sacral dimple  Neuro: +alberto, +babinski, grasp, good tone throughout Peds called to LDR for meconium. 41+2 wk SGA female born via  to a 31 y/o  mother.  Maternal hx of prolactinoma (meds stopped w/ preg), umbilical hernia, HSV+ (on valtrex, spec neg). Maternal labs include Blood Type O+ , HIV - , RPR NR , Rubella I , Hep B - , GBS + on  (received amp x3) . ROM with meconium fluids (ROM hours: approx 10hrs). Highest maternal temp: 38.3 C. EOS 0.85. Baby emerged vigorous, crying, was warmed, dried, suctioned, and stimulated with APGARS of 9/9. Resuscitation included: bulb suction and stimulation. Mom plans to initiate breastfeeding, consents Hep B vaccine.    : 3/9/25  TOB: 00:41  BW: 2700 g    Physical Exam (Post-Delivery)  Gen: NAD; well-appearing  HEENT: NC/AT; anterior fontanelle open and flat; ears and nose clinically patent, normally set; no tags, no cleft palate appreciated  Skin: pink, warm, well-perfused, no rash  Resp: non-labored breathing  Abd: soft, NT/ND; no masses appreciated, umbilical cord with 3 vessels  Extremities: moving all extremities, no crepitus; hips negative O/B  MSK: no clavicular fracture appreciated  : Geraldo I; no abnormalities; anus patent  Back: no sacral dimple  Neuro: +alberto, +babinski, grasp, good tone throughout

## 2025-03-09 NOTE — DISCHARGE NOTE OB - PLAN OF CARE
pp care After discharge, please stay on pelvic rest for 6 weeks, meaning no sexual intercourse, no tampons and no douching.  No driving for 2 weeks.  No lifting objects heavier than baby for 2 weeks.  Expect to have vaginal bleeding/spotting for up to six weeks.  The bleeding should get lighter and more white/light brown with time.  For bleeding soaking more than a pad an hour or passing clots greater than the size of your fist, come in to the emergency department.  Follow up in the office in 6 weeks

## 2025-03-09 NOTE — DISCHARGE NOTE OB - CARE PROVIDER_API CALL
Keisha Witt  Obstetrics and Gynecology  [address] [place] [id number]  Phone: [phone]  Fax: [phone]  Follow Up Time:

## 2025-03-09 NOTE — OB RN DELIVERY SUMMARY - NSSELHIDDEN_OBGYN_ALL_OB_FT
[NS_DeliveryAttending1_OBGYN_ALL_OB_FT:[id number]],[NS_DeliveryAssist1_OBGYN_ALL_OB_FT:[id number]],[NS_DeliveryRN_OBGYN_ALL_OB_FT:[id number]]

## 2025-03-09 NOTE — DISCHARGE NOTE OB - PATIENT PORTAL LINK FT
You can access the FollowMyHealth Patient Portal offered by [facility] by registering at the following website: [email or web address]. By joining [facility]’s FollowMyHealth portal, you will also be able to view your health information using other applications (apps) compatible with our system.

## 2025-03-09 NOTE — OB RN DELIVERY SUMMARY - AS DELIV COMPLICATIONS OB
chorioamnionitis/maternal fever/meconium stained fluid abnormal fetal heart rate tracing/chorioamnionitis/maternal fever/meconium stained fluid

## 2025-03-09 NOTE — LACTATION INITIAL EVALUATION - LACTATION INTERVENTIONS
assisted to put baby to rt. breast in football hold position;  baby is sleepy and no latch was achieved;  she is less than 24 hours old and parents were reassured that this is WNL/initiate/review safe skin-to-skin/initiate/review hand expression/initiate/review techniques for position and latch/initiate/review breast massage/compression/reviewed components of an effective feeding and at least 8 effective feedings per day required/reviewed importance of monitoring infant diapers, the breastfeeding log, and minimum output each day/reviewed risks of unnecessary formula supplementation/reviewed risks of artificial nipples/reviewed benefits and recommendations for rooming in/reviewed feeding on demand/by cue at least 8 times a day/reviewed indications of inadequate milk transfer that would require supplementation

## 2025-03-09 NOTE — OB RN DELIVERY SUMMARY - NSNUMBEROFNEWBORNS_OBGYN_ALL_OB_NU
Please get baseline labs done at either [facility] or [facility] outpatient labs. They are open until 8pm and open again at 6:30am.   There is an order in place for follow-up labs for 6 and 12 weeks out as well.  Please set it up an appointment for an annual physical with Dr. Cee.  I will be in touch on Thursday, 5/16/24 or message me through My Chart,  Kindly, DAHIANA Brenner   1

## 2025-03-09 NOTE — OB PROVIDER DELIVERY SUMMARY - NSSELHIDDEN_OBGYN_ALL_OB_FT
[NS_DeliveryAttending1_OBGYN_ALL_OB_FT:[id number]],[NS_DeliveryAssist1_OBGYN_ALL_OB_FT:[id number]]

## 2025-03-09 NOTE — OB RN DELIVERY SUMMARY - NS_SEPSISRSKCALC_OBGYN_ALL_OB_FT
EOS calculated successfully. EOS Risk Factor: 0.5/1000 live births ([facility] national incidence); GA=41w2d; Temp=100.94; ROM=9.45; GBS='Positive'; Antibiotics='GBS specific antibiotics > 2 hrs prior to birth'

## 2025-03-09 NOTE — DISCHARGE NOTE OB - MEDICATION SUMMARY - MEDICATIONS TO TAKE
I will START or STAY ON the medications listed below when I get home from the hospital:    acetaminophen 325 mg oral tablet  -- 3 tab(s) by mouth every 6 hours  -- Indication: For Pain    benzocaine 20% topical spray  -- 1 Apply on skin to affected area every 6 hours As needed for Perineal discomfort  -- Indication: For Perineal pain    dibucaine 1% topical ointment  -- 1 Apply on skin to affected area every 6 hours As needed Perineal discomfort  -- Indication: For Perineal pain    pramoxine 1% topical cream  -- 1 Apply on skin to affected area every 4 hours As needed Moderate Pain (4-6)  -- Indication: For Perineal pain    hydrocortisone 1% topical cream  -- 1 Apply on skin to affected area every 6 hours As needed Moderate Pain (4-6)  -- Indication: For Perineal pain    lanolin topical ointment  -- 1 Apply on skin to affected area every 6 hours As needed nipple soreness  -- Indication: For sore nipples    witch hazel 50% topical pad  -- 1 Apply on skin to affected area every 4 hours As needed Perineal discomfort  -- Indication: For Perineal pain

## 2025-03-09 NOTE — OB PROVIDER DELIVERY SUMMARY - NSLOWPPHRISK_OBGYN_A_OB
----- Message from Horace Salter sent at 3/9/2022 11:46 AM CST -----  Contact: self  Patient called and stated that [facility] stated they did not get her script for her ADHD medication.    Called patients pharmacy to see what the status of the patients medication was. Pharmacy stated they will text patient when her script is ready for pickup. Relayed message to patient. Patient stated understanding. KB LPN.       No previous uterine incision/Edouard Pregnancy/Less than or equal to 4 previous vaginal births/No known bleeding disorder/No history of postpartum hemorrhage

## 2025-03-09 NOTE — DISCHARGE NOTE OB - SWOLLEN AREA ON THE LEG THAT IS PAINFUL, RED OR HOT
Statement Selected Medical Necessity Clause: This procedure was medically necessary because the lesions that were treated were: Post-Care Instructions: I reviewed with the patient in detail post-care instructions. Patient is to wear sunprotection, and avoid picking at any of the treated lesions. Pt may apply Vaseline to crusted or scabbing areas. Add 52 Modifier (Optional): no Consent: The patient's consent was obtained including but not limited to risks of crusting, scabbing, blistering, scarring, darker or lighter pigmentary change, recurrence, incomplete removal and infection. Thicker lesions may require multiple treatments. Detail Level: Detailed Medical Necessity Information: It is in your best interest to select a reason for this procedure from the list below. All of these items fulfill various CMS LCD requirements except the new and changing color options. Anesthesia Volume In Cc: 0.7 Treatment Number (Will Not Render If 0): 0

## 2025-03-09 NOTE — OB PROVIDER DELIVERY SUMMARY - NSPROVIDERDELIVERYNOTE_OBGYN_ALL_OB_FT
Spontaneous vaginal delivery of liveborn infant from WILFREDO position. Head, shoulders, and body delivered easily by MD Benitez. Infant was suctioned. Meconium present.  Cord was clamped and cut and infant was passed to pediatricians for fetal assessment. Placenta delivered intact with a 3 vessel cord. Fundal massage was given and brisk uterine bleeding was noted. Bimanual uterine massage performed. Clots expressed. Pitocin 20 units IM and 0.2mg Methergine IM administered. Post bimanual uterine massage and uterotonic agents, bleeding noted to be minimal. Vaginal exam revealed an intact cervix, vaginal walls and sulci. Patient had periurethral lacerations that was repaired with 3.0 chromic suture in the usual fashion. 1st degree laceration in the perineum that was repaired with 2.0 chromic suture. Excellent hemostasis was noted. Count was correct x 2.     QBL: 438cc    Attending: NOA Benitez MD  Assistant with repair: ELIAS Bernal NP

## 2025-03-09 NOTE — DISCHARGE NOTE OB - CARE PLAN
1 Principal Discharge DX:	 (normal spontaneous vaginal delivery)  Assessment and plan of treatment:	pp care   Principal Discharge DX:	 (normal spontaneous vaginal delivery)  Assessment and plan of treatment:	After discharge, please stay on pelvic rest for 6 weeks, meaning no sexual intercourse, no tampons and no douching.  No driving for 2 weeks.  No lifting objects heavier than baby for 2 weeks.  Expect to have vaginal bleeding/spotting for up to six weeks.  The bleeding should get lighter and more white/light brown with time.  For bleeding soaking more than a pad an hour or passing clots greater than the size of your fist, come in to the emergency department.  Follow up in the office in 6 weeks

## 2025-03-10 LAB
BASOPHILS # BLD AUTO: 0.05 K/UL — SIGNIFICANT CHANGE UP (ref 0–0.2)
BASOPHILS NFR BLD AUTO: 0.4 % — SIGNIFICANT CHANGE UP (ref 0–2)
EOSINOPHIL # BLD AUTO: 0.15 K/UL — SIGNIFICANT CHANGE UP (ref 0–0.5)
EOSINOPHIL NFR BLD AUTO: 1.2 % — SIGNIFICANT CHANGE UP (ref 0–6)
HCT VFR BLD CALC: 26.1 % — LOW (ref 34.5–45)
HGB BLD-MCNC: 9.2 G/DL — LOW (ref 11.5–15.5)
IANC: 9.52 K/UL — HIGH (ref 1.8–7.4)
IMM GRANULOCYTES NFR BLD AUTO: 0.5 % — SIGNIFICANT CHANGE UP (ref 0–0.9)
LYMPHOCYTES # BLD AUTO: 16.2 % — SIGNIFICANT CHANGE UP (ref 13–44)
LYMPHOCYTES # BLD AUTO: 2.07 K/UL — SIGNIFICANT CHANGE UP (ref 1–3.3)
MCHC RBC-ENTMCNC: 30.7 PG — SIGNIFICANT CHANGE UP (ref 27–34)
MCHC RBC-ENTMCNC: 35.2 G/DL — SIGNIFICANT CHANGE UP (ref 32–36)
MCV RBC AUTO: 87 FL — SIGNIFICANT CHANGE UP (ref 80–100)
MONOCYTES # BLD AUTO: 0.93 K/UL — HIGH (ref 0–0.9)
MONOCYTES NFR BLD AUTO: 7.3 % — SIGNIFICANT CHANGE UP (ref 2–14)
NEUTROPHILS # BLD AUTO: 9.52 K/UL — HIGH (ref 1.8–7.4)
NEUTROPHILS NFR BLD AUTO: 74.4 % — SIGNIFICANT CHANGE UP (ref 43–77)
NRBC # BLD AUTO: 0 K/UL — SIGNIFICANT CHANGE UP (ref 0–0)
NRBC # FLD: 0 K/UL — SIGNIFICANT CHANGE UP (ref 0–0)
NRBC BLD AUTO-RTO: 0 /100 WBCS — SIGNIFICANT CHANGE UP (ref 0–0)
PLATELET # BLD AUTO: 301 K/UL — SIGNIFICANT CHANGE UP (ref 150–400)
RBC # BLD: 3 M/UL — LOW (ref 3.8–5.2)
RBC # FLD: 16.3 % — HIGH (ref 10.3–14.5)
WBC # BLD: 12.79 K/UL — HIGH (ref 3.8–10.5)
WBC # FLD AUTO: 12.79 K/UL — HIGH (ref 3.8–10.5)

## 2025-03-10 PROCEDURE — 88307 TISSUE EXAM BY PATHOLOGIST: CPT | Mod: 26

## 2025-03-10 RX ORDER — SODIUM CHLORIDE 9 G/1000ML
500 INJECTION, SOLUTION INTRAVENOUS ONCE
Refills: 0 | Status: COMPLETED | OUTPATIENT
Start: 2025-03-10 | End: 2025-03-10

## 2025-03-10 RX ORDER — FERROUS SULFATE 137(45) MG
325 TABLET, EXTENDED RELEASE ORAL
Refills: 0 | Status: DISCONTINUED | OUTPATIENT
Start: 2025-03-10 | End: 2025-03-11

## 2025-03-10 RX ADMIN — Medication 975 MILLIGRAM(S): at 02:57

## 2025-03-10 RX ADMIN — Medication 975 MILLIGRAM(S): at 02:27

## 2025-03-10 RX ADMIN — Medication 200 GRAM(S): at 04:30

## 2025-03-10 RX ADMIN — Medication 3 MILLILITER(S): at 23:51

## 2025-03-10 RX ADMIN — Medication 975 MILLIGRAM(S): at 22:20

## 2025-03-10 RX ADMIN — Medication 975 MILLIGRAM(S): at 14:36

## 2025-03-10 RX ADMIN — SODIUM CHLORIDE 500 MILLILITER(S): 9 INJECTION, SOLUTION INTRAVENOUS at 06:20

## 2025-03-10 RX ADMIN — Medication 975 MILLIGRAM(S): at 23:20

## 2025-03-10 RX ADMIN — Medication 1 TABLET(S): at 13:46

## 2025-03-10 RX ADMIN — Medication 975 MILLIGRAM(S): at 08:33

## 2025-03-10 RX ADMIN — Medication 975 MILLIGRAM(S): at 15:06

## 2025-03-10 RX ADMIN — Medication 975 MILLIGRAM(S): at 09:03

## 2025-03-10 RX ADMIN — Medication 3 MILLILITER(S): at 05:32

## 2025-03-10 RX ADMIN — Medication 3 MILLILITER(S): at 14:00

## 2025-03-10 RX ADMIN — Medication 500 MILLIGRAM(S): at 13:45

## 2025-03-10 RX ADMIN — Medication 325 MILLIGRAM(S): at 14:35

## 2025-03-10 NOTE — PROGRESS NOTE ADULT - ASSESSMENT
NP:  day 1 Progress Note:     Patient seen at bedside resting comfortably offers no current complaints. Ambulating and voiding without difficulty.  Passing flatus and tolerating regular diet.  Bonding well with .  Breastfeeding exclusively . Denies HA, CP, SOB, N/V/D, dizziness, palpitations,  worsening vaginal bleeding, or any other concerns.      Vital Signs Last 24 Hrs  T(C): 36.4 (10 Mar 2025 09:49), Max: 36.7 (10 Mar 2025 05:44)  T(F): 97.6 (10 Mar 2025 09:49), Max: 98 (10 Mar 2025 05:44)  HR: 64 (10 Mar 2025 09:49) (55 - 70)  BP: 92/51 (10 Mar 2025 09:49) (86/48 - 93/54)  BP(mean): --  RR: 14 (10 Mar 2025 09:49) (14 - 18)  SpO2: 100% (10 Mar 2025 09:49) (99% - 100%)    Parameters below as of 10 Mar 2025 09:49  Patient On (Oxygen Delivery Method): room air        Physical Exam:     Gen: A&Ox 3, NAD  Chest: CTA B/L  Cardiac: S1,S2; RRR  Breast: Soft, nontender, nonengorged  Abdomen: +BS, Soft, nontender, ND; Fundus firm below umbilicus  Gyn: mod lochia, intact/repaired  Ext: Nontender, DTRS 2+, no worsening edema                          9.2    12.79 )-----------( 301      ( 10 Mar 2025 05:31 )             26.1       A/P: 29yr old F PPD#1 s/p  3/9/2025 c/b [place]     #Chorio   - Met criteria via maternal temp of 38.3 and fetal tachycardia   - Zosyn x 24hrs  - Pt is asymptomatic     #PP   - Continue pain management  - Encourage OOB and ambulation  - Check CBC  - Continue current care  - Discharge planning     -d/w dr Clement Melo NP

## 2025-03-11 VITALS
HEART RATE: 69 BPM | DIASTOLIC BLOOD PRESSURE: 62 MMHG | TEMPERATURE: 98 F | OXYGEN SATURATION: 100 % | RESPIRATION RATE: 17 BRPM | SYSTOLIC BLOOD PRESSURE: 100 MMHG

## 2025-03-11 RX ADMIN — Medication 975 MILLIGRAM(S): at 05:27

## 2025-03-11 RX ADMIN — Medication 975 MILLIGRAM(S): at 09:34

## 2025-03-11 RX ADMIN — Medication 975 MILLIGRAM(S): at 04:27

## 2025-03-11 RX ADMIN — Medication 325 MILLIGRAM(S): at 06:18

## 2025-03-11 RX ADMIN — Medication 975 MILLIGRAM(S): at 10:30

## 2025-03-11 RX ADMIN — Medication 3 MILLILITER(S): at 05:30

## 2025-03-11 NOTE — PROGRESS NOTE ADULT - SUBJECTIVE AND OBJECTIVE BOX
Post-partum Note,   She is a  29y woman who is now post-partum day: 2    Subjective:  The patient feels well.  She is ambulating.   She is tolerating regular diet.  She denies nausea and vomiting; denies fever.  She is voiding.  Her pain is controlled.  She reports normal postpartum bleeding.  She is breastfeeding.  She is formula feeding.  She is bonding with infant.    Physical exam:    Vital Signs Last 24 Hrs  T(C): 36.7 (11 Mar 2025 05:40), Max: 36.7 (11 Mar 2025 01:20)  T(F): 98 (11 Mar 2025 05:40), Max: 98 (11 Mar 2025 01:20)  HR: 61 (11 Mar 2025 05:40) (55 - 63)  BP: 92/60 (11 Mar 2025 05:40) (85/53 - 92/60)  BP(mean): --  RR: 16 (11 Mar 2025 05:40) (16 - 17)  SpO2: 100% (11 Mar 2025 05:40) (100% - 100%)    Parameters below as of 11 Mar 2025 05:40  Patient On (Oxygen Delivery Method): room air        Gen: NAD  Breast: Soft, nontender, not engorged.  Abdomen: Soft, nontender, no distension , firm uterine fundus at umbilicus.  Pelvic: Normal lochia noted  Ext: No calf tenderness    LABS:                        9.2    12.79 )-----------( 301      ( 10 Mar 2025 05:31 )             26.1       Rubella status:     Allergies    No Known Allergies    Intolerances      MEDICATIONS  (STANDING):  acetaminophen     Tablet .. 975 milliGRAM(s) Oral every 6 hours  acetaminophen   IVPB .. 1000 milliGRAM(s) IV Intermittent once  ascorbic acid 500 milliGRAM(s) Oral daily  diphtheria/tetanus/pertussis (acellular) Vaccine (Adacel) 0.5 milliLiter(s) IntraMuscular once  ferrous    sulfate 325 milliGRAM(s) Oral two times a day  influenza   Vaccine 0.5 milliLiter(s) IntraMuscular once  prenatal multivitamin 1 Tablet(s) Oral daily  sodium chloride 0.9% lock flush 3 milliLiter(s) IV Push every 8 hours    MEDICATIONS  (PRN):  benzocaine 20%/menthol 0.5% Spray 1 Spray(s) Topical every 6 hours PRN for Perineal discomfort  dibucaine 1% Ointment 1 Application(s) Topical every 6 hours PRN Perineal discomfort  diphenhydrAMINE 25 milliGRAM(s) Oral every 6 hours PRN Pruritus  hydrocortisone 1% Cream 1 Application(s) Topical every 6 hours PRN Moderate Pain (4-6)  lanolin Ointment 1 Application(s) Topical every 6 hours PRN nipple soreness  magnesium hydroxide Suspension 30 milliLiter(s) Oral two times a day PRN Constipation  oxyCODONE    IR 5 milliGRAM(s) Oral every 3 hours PRN Moderate to Severe Pain (4-10)  oxyCODONE    IR 5 milliGRAM(s) Oral once PRN Moderate to Severe Pain (4-10)  pramoxine 1%/zinc 5% Cream 1 Application(s) Topical every 4 hours PRN Moderate Pain (4-6)  simethicone 80 milliGRAM(s) Chew every 4 hours PRN Gas  witch hazel Pads 1 Application(s) Topical every 4 hours PRN Perineal discomfort        Assessment and Plan  PPD #2 s/p   Doing well.  Increase ambulation.  Encourage breastfeeding.  PP & PPD Instructions reviewed.  PP educational materials reviewed & provided     D/C home today     MD Luis

## 2025-03-14 ENCOUNTER — APPOINTMENT (OUTPATIENT)
Age: 30
End: 2025-03-14
Payer: MEDICAID

## 2025-03-14 PROCEDURE — S9445: CPT | Mod: 95

## 2025-03-18 RX ORDER — ASPIRIN 325 MG
1 TABLET ORAL
Refills: 0 | DISCHARGE

## 2025-03-18 RX ORDER — PRENATAL 136/IRON/FOLIC ACID 27 MG-1 MG
0 TABLET ORAL
Refills: 0 | DISCHARGE

## 2025-03-19 LAB — SURGICAL PATHOLOGY STUDY: SIGNIFICANT CHANGE UP

## 2025-04-30 NOTE — OB RN DELIVERY SUMMARY - BABY A: APGAR 1 MIN COLOR, DELIVERY
Patient is asking if she can drink smart water with her \"cardiac stuff\" that she has going on? Concerned that it may have too high of potassium chloride? Please return her call at [phone]    (1) body pink, extremities blue